# Patient Record
Sex: MALE | Race: OTHER | HISPANIC OR LATINO | URBAN - METROPOLITAN AREA
[De-identification: names, ages, dates, MRNs, and addresses within clinical notes are randomized per-mention and may not be internally consistent; named-entity substitution may affect disease eponyms.]

---

## 2020-08-27 ENCOUNTER — EMERGENCY (EMERGENCY)
Facility: HOSPITAL | Age: 25
LOS: 1 days | Discharge: HOME | End: 2020-08-27
Admitting: EMERGENCY MEDICINE
Payer: OTHER MISCELLANEOUS

## 2020-08-27 VITALS
TEMPERATURE: 98 F | DIASTOLIC BLOOD PRESSURE: 57 MMHG | RESPIRATION RATE: 16 BRPM | HEART RATE: 58 BPM | OXYGEN SATURATION: 100 % | SYSTOLIC BLOOD PRESSURE: 120 MMHG

## 2020-08-27 PROCEDURE — 99283 EMERGENCY DEPT VISIT LOW MDM: CPT

## 2020-08-27 PROCEDURE — 73130 X-RAY EXAM OF HAND: CPT | Mod: 26,LT

## 2020-08-27 RX ORDER — TETANUS TOXOID, REDUCED DIPHTHERIA TOXOID AND ACELLULAR PERTUSSIS VACCINE, ADSORBED 5; 2.5; 8; 8; 2.5 [IU]/.5ML; [IU]/.5ML; UG/.5ML; UG/.5ML; UG/.5ML
0.5 SUSPENSION INTRAMUSCULAR ONCE
Refills: 0 | Status: COMPLETED | OUTPATIENT
Start: 2020-08-27 | End: 2020-08-27

## 2020-08-27 RX ADMIN — TETANUS TOXOID, REDUCED DIPHTHERIA TOXOID AND ACELLULAR PERTUSSIS VACCINE, ADSORBED 0.5 MILLILITER(S): 5; 2.5; 8; 8; 2.5 SUSPENSION INTRAMUSCULAR at 22:50

## 2020-08-27 NOTE — ED PROVIDER NOTE - OBJECTIVE STATEMENT
24 y/o male with no PMHx presents to ED with injury to his L pinky finger. Pt works in [employer] and when closing the bulk head door in the front, he got his L pinky stuck in the door. Unknown last tetanus shot. Denies numbness, tingling, weakness. Pt has history of prior injury at the site of current injury. No other acute complaints at time of eval.

## 2020-08-27 NOTE — ED PROVIDER NOTE - PROGRESS NOTE DETAILS
SVETLANA Do: xray read as normal but concern for distal fracture. given abrasion to distal finger will give Keflex. Pt placed in finger splint

## 2020-08-27 NOTE — ED ADULT TRIAGE NOTE - CHIEF COMPLAINT QUOTE
left 5th finger injury    pt got left 5th finger caught in door approx 3 hrs ago.  swollen and painful

## 2020-08-27 NOTE — ED PROVIDER NOTE - PATIENT PORTAL LINK FT
You can access the FollowMyHealth Patient Portal offered by [facility] by registering at the following website: [email or web address]. By joining [facility]’s FollowMyHealth portal, you will also be able to view your health information using other applications (apps) compatible with our system.

## 2020-08-27 NOTE — ED PROVIDER NOTE - NSFOLLOWUPINSTRUCTIONS_ED_ALL_ED_FT
Follow up with your primary care doctor within 1 week  Take Keflex 500mg (1 tablet) twice daily for 5 days  Apply bacitracin to area daily  Keep in splint for 5 days  Return to the ER with any worsening or concerning symptoms, increased pain, fever, pus drainage from site or any other concerns.

## 2020-08-27 NOTE — ED PROVIDER NOTE - CLINICAL SUMMARY MEDICAL DECISION MAKING FREE TEXT BOX
26 y/o male with no PMHx presents with L pinky injury. Abrasion noted to distal end of L pinky. FROM of L pinky, sensations intact. Plan to x-ray L pinky to r/o fracture. Update tetanus shot. Likely PMD f/u.

## 2020-08-28 RX ORDER — CEPHALEXIN 500 MG
1 CAPSULE ORAL
Qty: 10 | Refills: 0
Start: 2020-08-28 | End: 2020-09-01

## 2024-06-18 NOTE — ED ADULT NURSE NOTE - NS ED NOTE  TALK SOMEONE YN
Pt c/o scattered itchy , red blistering rash. Pt has areas of rash to her arms, legs, upper thighs and left shoulder. Pt states she did garden Saturday and Sunday.    No